# Patient Record
Sex: MALE | Race: BLACK OR AFRICAN AMERICAN | NOT HISPANIC OR LATINO | Employment: FULL TIME | URBAN - METROPOLITAN AREA
[De-identification: names, ages, dates, MRNs, and addresses within clinical notes are randomized per-mention and may not be internally consistent; named-entity substitution may affect disease eponyms.]

---

## 2021-05-14 ENCOUNTER — HOSPITAL ENCOUNTER (EMERGENCY)
Facility: HOSPITAL | Age: 31
Discharge: HOME | End: 2021-05-14
Attending: EMERGENCY MEDICINE

## 2021-05-14 VITALS
OXYGEN SATURATION: 100 % | HEART RATE: 100 BPM | TEMPERATURE: 98.1 F | SYSTOLIC BLOOD PRESSURE: 145 MMHG | DIASTOLIC BLOOD PRESSURE: 98 MMHG | RESPIRATION RATE: 17 BRPM

## 2021-05-14 DIAGNOSIS — M79.671 PAIN IN BOTH FEET: Primary | ICD-10-CM

## 2021-05-14 DIAGNOSIS — M79.672 PAIN IN BOTH FEET: Primary | ICD-10-CM

## 2021-05-14 LAB
ALBUMIN SERPL BCP-MCNC: 4.4 G/DL (ref 3.4–4.8)
ALP SERPL-CCNC: 58.8 U/L (ref 10–129)
ALT SERPL W P-5'-P-CCNC: 13 U/L (ref 5–63)
ANION GAP SERPL CALCULATED.3IONS-SCNC: 8 MMOL/L (ref 4–13)
AST SERPL W P-5'-P-CCNC: 13 U/L (ref 15–41)
BASOPHILS # BLD AUTO: 0.02 THOUSANDS/ΜL (ref 0–0.1)
BASOPHILS NFR BLD AUTO: 0 % (ref 0–1)
BILIRUB SERPL-MCNC: 2.04 MG/DL (ref 0.3–1.2)
BUN SERPL-MCNC: 7 MG/DL (ref 6–20)
CALCIUM SERPL-MCNC: 9.7 MG/DL (ref 8.4–10.2)
CHLORIDE SERPL-SCNC: 94 MMOL/L (ref 96–108)
CO2 SERPL-SCNC: 30 MMOL/L (ref 22–33)
CREAT SERPL-MCNC: 0.82 MG/DL (ref 0.5–1.2)
CRP SERPL QL: 0.1 MG/L (ref 0–1)
D DIMER PPP FEU-MCNC: 0.21 MG/L FEU (ref 0.19–0.49)
EOSINOPHIL # BLD AUTO: 0.12 THOUSAND/ΜL (ref 0–0.61)
EOSINOPHIL NFR BLD AUTO: 3 % (ref 0–6)
ERYTHROCYTE [DISTWIDTH] IN BLOOD BY AUTOMATED COUNT: 12.2 % (ref 11.6–15.1)
GFR SERPL CREATININE-BSD FRML MDRD: 136 ML/MIN/1.73SQ M
GLUCOSE SERPL-MCNC: 397 MG/DL (ref 65–140)
HCT VFR BLD AUTO: 42.1 % (ref 36.5–49.3)
HGB BLD-MCNC: 14.5 G/DL (ref 12–17)
IMM GRANULOCYTES # BLD AUTO: 0 THOUSAND/UL (ref 0–0.2)
IMM GRANULOCYTES NFR BLD AUTO: 0 % (ref 0–2)
LYMPHOCYTES # BLD AUTO: 2.08 THOUSANDS/ΜL (ref 0.6–4.47)
LYMPHOCYTES NFR BLD AUTO: 45 % (ref 14–44)
MCH RBC QN AUTO: 28.5 PG (ref 26.8–34.3)
MCHC RBC AUTO-ENTMCNC: 34.4 G/DL (ref 31.4–37.4)
MCV RBC AUTO: 83 FL (ref 82–98)
MONOCYTES # BLD AUTO: 0.4 THOUSAND/ΜL (ref 0.17–1.22)
MONOCYTES NFR BLD AUTO: 9 % (ref 4–12)
NEUTROPHILS # BLD AUTO: 1.97 THOUSANDS/ΜL (ref 1.85–7.62)
NEUTS SEG NFR BLD AUTO: 43 % (ref 43–75)
PLATELET # BLD AUTO: 406 THOUSANDS/UL (ref 149–390)
PMV BLD AUTO: 9.4 FL (ref 8.9–12.7)
POTASSIUM SERPL-SCNC: 4.4 MMOL/L (ref 3.5–5)
PROT SERPL-MCNC: 8 G/DL (ref 6.4–8.3)
RBC # BLD AUTO: 5.08 MILLION/UL (ref 3.88–5.62)
SODIUM SERPL-SCNC: 132 MMOL/L (ref 133–145)
WBC # BLD AUTO: 4.59 THOUSAND/UL (ref 4.31–10.16)

## 2021-05-14 PROCEDURE — 85379 FIBRIN DEGRADATION QUANT: CPT | Performed by: PHYSICIAN ASSISTANT

## 2021-05-14 PROCEDURE — 86140 C-REACTIVE PROTEIN: CPT | Performed by: PHYSICIAN ASSISTANT

## 2021-05-14 PROCEDURE — 99284 EMERGENCY DEPT VISIT MOD MDM: CPT | Performed by: PHYSICIAN ASSISTANT

## 2021-05-14 PROCEDURE — 99283 EMERGENCY DEPT VISIT LOW MDM: CPT

## 2021-05-14 PROCEDURE — 36415 COLL VENOUS BLD VENIPUNCTURE: CPT | Performed by: PHYSICIAN ASSISTANT

## 2021-05-14 PROCEDURE — 96374 THER/PROPH/DIAG INJ IV PUSH: CPT

## 2021-05-14 PROCEDURE — 80053 COMPREHEN METABOLIC PANEL: CPT | Performed by: PHYSICIAN ASSISTANT

## 2021-05-14 PROCEDURE — 85025 COMPLETE CBC W/AUTO DIFF WBC: CPT | Performed by: PHYSICIAN ASSISTANT

## 2021-05-14 RX ORDER — TRAMADOL HYDROCHLORIDE 50 MG/1
50 TABLET ORAL EVERY 6 HOURS PRN
Qty: 15 TABLET | Refills: 0 | Status: SHIPPED | OUTPATIENT
Start: 2021-05-14

## 2021-05-14 RX ORDER — KETOROLAC TROMETHAMINE 30 MG/ML
30 INJECTION, SOLUTION INTRAMUSCULAR; INTRAVENOUS ONCE
Status: COMPLETED | OUTPATIENT
Start: 2021-05-14 | End: 2021-05-14

## 2021-05-14 RX ADMIN — KETOROLAC TROMETHAMINE 30 MG: 30 INJECTION, SOLUTION INTRAMUSCULAR at 11:49

## 2021-05-14 NOTE — DISCHARGE INSTRUCTIONS
Use Tylenol every 4 hours or Motrin every 6 hours; you can alternate the 2 medications taking something every 3 hours for pain, if no improvement, add Tramadol    If no improvement follow-up with your doctor in next few days

## 2021-05-14 NOTE — ED PROVIDER NOTES
History  Chief Complaint   Patient presents with    Foot Pain     pt c/o b/l foot pain x a month and a half, no injury     Patient with no PMH, no PSH presents to emergency department c/o 1 5 month h/o LE/feet pain that began in upper legs, then whole legs, now settled mostly in feet  Patient denies injury, does wear supportive shoes/sneakers, states works at [employer] so does walk around a lot  Patient states symptoms are worse in the morning after he has been resting, and also worse after he has been walking around, no difference  Patient states no difference when legs are hanging down  Patient denies fever, no back pain, no urinary complaints, no NVD, no bowel changes, no focal weakness, no numbness and tingling, no skin changes, no rash, no joint pain or swelling  None       History reviewed  No pertinent past medical history  History reviewed  No pertinent surgical history  History reviewed  No pertinent family history  I have reviewed and agree with the history as documented  E-Cigarette/Vaping    E-Cigarette Use Never User      E-Cigarette/Vaping Substances     Social History     Tobacco Use    Smoking status: Never Smoker    Smokeless tobacco: Never Used   Substance Use Topics    Alcohol use: Never     Frequency: Never    Drug use: Never       Review of Systems   Constitutional: Negative for chills and fever  HENT: Negative for hearing loss, mouth sores, sore throat and trouble swallowing  Eyes: Negative for visual disturbance  Respiratory: Negative for cough and shortness of breath  Cardiovascular: Negative for chest pain and leg swelling  Gastrointestinal: Negative for abdominal pain, nausea and vomiting  Genitourinary: Negative for dysuria, flank pain, frequency and genital sores  Musculoskeletal: Positive for myalgias  Negative for arthralgias, gait problem and joint swelling  Skin: Negative for color change, pallor, rash and wound     Neurological: Negative for dizziness, weakness, light-headedness, numbness and headaches  Psychiatric/Behavioral: Negative for behavioral problems  The patient is not nervous/anxious  All other systems reviewed and are negative  Physical Exam  Physical Exam  Vitals signs and nursing note reviewed  Constitutional:       General: He is not in acute distress  Appearance: Normal appearance  He is well-developed  HENT:      Head: Normocephalic and atraumatic  Right Ear: External ear normal       Left Ear: External ear normal       Nose: Nose normal       Mouth/Throat:      Mouth: Mucous membranes are moist       Pharynx: Oropharynx is clear  Eyes:      Conjunctiva/sclera: Conjunctivae normal    Neck:      Musculoskeletal: Normal range of motion  Cardiovascular:      Rate and Rhythm: Normal rate and regular rhythm  Pulmonary:      Effort: Pulmonary effort is normal  No respiratory distress  Breath sounds: Normal breath sounds  Abdominal:      General: Bowel sounds are normal       Palpations: Abdomen is soft  Musculoskeletal: Normal range of motion  General: No swelling, tenderness, deformity or signs of injury  Right lower leg: No edema  Left lower leg: No edema  Comments: Pt points to area of proximal ankle, musculature and along top/bottoms of feet as area of tenderness, no obvious swelling, no joint pain or swelling, good distal pulses, good cap refill, no erythema or warmth   Lymphadenopathy:      Cervical: No cervical adenopathy  Skin:     General: Skin is warm and dry  Capillary Refill: Capillary refill takes less than 2 seconds  Findings: Rash (some dry skin noted to bottom of feet but no other skin changes) present  No lesion  Neurological:      General: No focal deficit present  Mental Status: He is alert and oriented to person, place, and time  Motor: No weakness     Psychiatric:         Behavior: Behavior normal          Vital Signs  ED Triage Vitals [05/14/21 1119]   Temperature Pulse Respirations Blood Pressure SpO2   98 1 °F (36 7 °C) 100 17 145/98 100 %      Temp Source Heart Rate Source Patient Position - Orthostatic VS BP Location FiO2 (%)   Oral Monitor Sitting Left arm --      Pain Score       Worst Possible Pain           Vitals:    05/14/21 1119   BP: 145/98   Pulse: 100   Patient Position - Orthostatic VS: Sitting         Visual Acuity      ED Medications  Medications   ketorolac (TORADOL) injection 30 mg (30 mg Intravenous Given 5/14/21 1149)       Diagnostic Studies  Results Reviewed     Procedure Component Value Units Date/Time    Comprehensive metabolic panel [[id number]]  (Abnormal) Collected: 05/14/21 1146    Lab Status: Final result Specimen: Blood from Arm, Right Updated: 05/14/21 1210     Sodium 132 mmol/L      Potassium 4 4 mmol/L      Chloride 94 mmol/L      CO2 30 mmol/L      ANION GAP 8 mmol/L      BUN 7 mg/dL      Creatinine 0 82 mg/dL      Glucose 397 mg/dL      Calcium 9 7 mg/dL      AST 13 U/L      ALT 13 U/L      Alkaline Phosphatase 58 8 U/L      Total Protein 8 0 g/dL      Albumin 4 4 g/dL      Total Bilirubin 2 04 mg/dL      eGFR 136 ml/min/1 73sq m     Narrative:      [place] guidelines for Chronic Kidney Disease (CKD):     Stage 1 with normal or high GFR (GFR > 90 mL/min/1 73 square meters)    Stage 2 Mild CKD (GFR = 60-89 mL/min/1 73 square meters)    Stage 3A Moderate CKD (GFR = 45-59 mL/min/1 73 square meters)    Stage 3B Moderate CKD (GFR = 30-44 mL/min/1 73 square meters)    Stage 4 Severe CKD (GFR = 15-29 mL/min/1 73 square meters)    Stage 5 End Stage CKD (GFR <15 mL/min/1 73 square meters)  Note: GFR calculation is accurate only with a steady state creatinine    C-reactive protein [[id number]]  (Normal) Collected: 05/14/21 1146    Lab Status: Final result Specimen: Blood from Arm, Right Updated: 05/14/21 1210     CRP 0 1 mg/L     D-Dimer [[id number]]  (Normal) Collected: 05/14/21 1146    Lab Status: Final result Specimen: Blood from Arm, Right Updated: 05/14/21 1205     D-Dimer, Quant  0 21 mg/L FEU     CBC and differential [[id number]]  (Abnormal) Collected: 05/14/21 1146    Lab Status: Final result Specimen: Blood from Arm, Right Updated: 05/14/21 1200     WBC 4 59 Thousand/uL      RBC 5 08 Million/uL      Hemoglobin 14 5 g/dL      Hematocrit 42 1 %      MCV 83 fL      MCH 28 5 pg      MCHC 34 4 g/dL      RDW 12 2 %      MPV 9 4 fL      Platelets 428 Thousands/uL      Neutrophils Relative 43 %      Immat GRANS % 0 %      Lymphocytes Relative 45 %      Monocytes Relative 9 %      Eosinophils Relative 3 %      Basophils Relative 0 %      Neutrophils Absolute 1 97 Thousands/µL      Immature Grans Absolute 0 00 Thousand/uL      Lymphocytes Absolute 2 08 Thousands/µL      Monocytes Absolute 0 40 Thousand/µL      Eosinophils Absolute 0 12 Thousand/µL      Basophils Absolute 0 02 Thousands/µL                  No orders to display              Procedures  Procedures         ED Course                             SBIRT 20yo+      Most Recent Value   SBIRT (22 yo +)   In order to provide better care to our patients, we are screening all of our patients for alcohol and drug use  Would it be okay to ask you these screening questions? Yes Filed at: 05/14/2021 1151   Initial Alcohol Screen: US AUDIT-C    1  How often do you have a drink containing alcohol?  0 Filed at: 05/14/2021 1151   2  How many drinks containing alcohol do you have on a typical day you are drinking? 0 Filed at: 05/14/2021 1151   3a  Male UNDER 65: How often do you have five or more drinks on one occasion? 0 Filed at: 05/14/2021 1151   3b  FEMALE Any Age, or MALE 65+: How often do you have 4 or more drinks on one occassion? 0 Filed at: 05/14/2021 1151   Audit-C Score  0 Filed at: 05/14/2021 1151   STEPHANIE: How many times in the past year have you    Used an illegal drug or used a prescription medication for non-medical reasons?   Gay White at: 05/14/2021 1151                    [facility]  Number of Diagnoses or Management Options     Amount and/or Complexity of Data Reviewed  Clinical lab tests: ordered and reviewed  Review and summarize past medical records: yes        Disposition  Final diagnoses:   Pain in both feet     Time reflects when diagnosis was documented in both MDM as applicable and the Disposition within this note     Time User Action Codes Description Comment    5/14/2021 12:57 PM [facility] [Y57 [id number],  M79 [id number]] Pain in both feet       ED Disposition     ED Disposition Condition Date/Time Comment    Discharge Stable Fri May 14, 2021 12:56 PM Macy Gastelum discharge to home/self care  Follow-up Information     Follow up With Specialties Details Why Contact Info Additional Information    [facility] Family Medicine   [address] [id number] [phone] AT [address] [facility], [address] [facility] [address] [place] [address], [id number], [phone]    [facility]    [phone]             Patient's Medications   Discharge Prescriptions    TRAMADOL (ULTRAM) 50 MG TABLET    Take 1 tablet (50 mg total) by mouth every 6 (six) hours as needed for moderate pain for up to 15 doses       Start Date: 5/14/2021 End Date: --       Order Dose: 50 mg       Quantity: 15 tablet    Refills: 0     No discharge procedures on file      PDMP Review     None          ED Provider  Electronically Signed by           Jose D Khan PA-C  05/14/21 0959

## 2021-05-14 NOTE — Clinical Note
Nydia Elian was seen and treated in our emergency department on 5/14/2021  Diagnosis:     Melvyn Galeazzi  may return to work on return date  He may return on this date: 05/15/2021         If you have any questions or concerns, please don't hesitate to call        Joe Dempsey PA-C    ______________________________           _______________          _______________  Hospital Representative                              Date                                Time